# Patient Record
Sex: FEMALE | Race: BLACK OR AFRICAN AMERICAN | NOT HISPANIC OR LATINO
[De-identification: names, ages, dates, MRNs, and addresses within clinical notes are randomized per-mention and may not be internally consistent; named-entity substitution may affect disease eponyms.]

---

## 2021-12-07 ENCOUNTER — RESULT REVIEW (OUTPATIENT)
Age: 61
End: 2021-12-07

## 2021-12-16 PROBLEM — Z00.00 ENCOUNTER FOR PREVENTIVE HEALTH EXAMINATION: Status: ACTIVE | Noted: 2021-12-16

## 2021-12-17 ENCOUNTER — APPOINTMENT (OUTPATIENT)
Dept: PEDIATRIC ORTHOPEDIC SURGERY | Facility: CLINIC | Age: 61
End: 2021-12-17
Payer: COMMERCIAL

## 2021-12-17 VITALS — HEIGHT: 63 IN | WEIGHT: 161 LBS | BODY MASS INDEX: 28.53 KG/M2

## 2021-12-17 DIAGNOSIS — M19.011 PRIMARY OSTEOARTHRITIS, RIGHT SHOULDER: ICD-10-CM

## 2021-12-17 DIAGNOSIS — M79.18 MYALGIA, OTHER SITE: ICD-10-CM

## 2021-12-17 DIAGNOSIS — S13.4XXA SPRAIN OF LIGAMENTS OF CERVICAL SPINE, INITIAL ENCOUNTER: ICD-10-CM

## 2021-12-17 PROCEDURE — 20552 NJX 1/MLT TRIGGER POINT 1/2: CPT | Mod: 59

## 2021-12-17 PROCEDURE — 99202 OFFICE O/P NEW SF 15 MIN: CPT | Mod: 25

## 2021-12-17 PROCEDURE — 72040 X-RAY EXAM NECK SPINE 2-3 VW: CPT

## 2021-12-17 PROCEDURE — 20610 DRAIN/INJ JOINT/BURSA W/O US: CPT

## 2021-12-18 PROBLEM — M19.011 DEGENERATIVE JOINT DISEASE OF RIGHT SHOULDER: Status: ACTIVE | Noted: 2021-12-18

## 2021-12-18 RX ADMIN — Medication 0 %: at 00:00

## 2021-12-18 RX ADMIN — TRIAMCINOLONE ACETONIDE 0 MG/ML: 40 INJECTION, SUSPENSION INTRA-ARTICULAR; INTRAMUSCULAR at 00:00

## 2021-12-18 RX ADMIN — LIDOCAINE HYDROCHLORIDE 0 %: 10 INJECTION, SOLUTION INFILTRATION; PERINEURAL at 00:00

## 2021-12-20 ENCOUNTER — MED ADMIN CHARGE (OUTPATIENT)
Age: 61
End: 2021-12-20

## 2021-12-20 PROBLEM — M79.18 CERVICAL MYOFASCIAL PAIN SYNDROME: Status: ACTIVE | Noted: 2021-12-20

## 2021-12-20 PROBLEM — S13.4XXA SPRAIN OF LIGAMENTS OF CERVICAL SPINE, INITIAL ENCOUNTER: Status: ACTIVE | Noted: 2021-12-20

## 2021-12-20 RX ORDER — TRIAMCINOLONE ACETONIDE 40 MG/ML
40 SUSPENSION INTRA-ARTERIAL; INTRAMUSCULAR
Qty: 1 | Refills: 0 | Status: COMPLETED | OUTPATIENT
Start: 2021-12-18

## 2021-12-20 RX ORDER — LIDOCAINE HYDROCHLORIDE 10 MG/ML
1 INJECTION, SOLUTION INFILTRATION; PERINEURAL
Qty: 0 | Refills: 0 | Status: COMPLETED | OUTPATIENT
Start: 2021-12-18

## 2021-12-20 RX ORDER — DICLOFENAC SODIUM 50 MG/1
50 TABLET, DELAYED RELEASE ORAL TWICE DAILY
Qty: 60 | Refills: 2 | Status: ACTIVE | COMMUNITY
Start: 2021-12-20 | End: 1900-01-01

## 2021-12-20 NOTE — HISTORY OF PRESENT ILLNESS
[de-identified] : This 61-year-old female is here for evaluation of the spontaneous onset of right cervical spine and right shoulder pain without history of injury.  The patient does not have neurological symptomatology.  She has not had any arm numbness.

## 2021-12-20 NOTE — PROCEDURE
[de-identified] : The patient has been injected into the right cervical spine musculature with 2 mL of 1% plain lidocaine and 1 mL of 40 mg of Kenalog.\par \par The right shoulder was injected with 2 mL of 1% plain lidocaine and 1 mL of 40 mg of Kenalog.

## 2021-12-20 NOTE — PHYSICAL EXAM
[de-identified] : On physical examination the patient has pain on attempts at right lateral bending and rotation of the cervical spine..  Motor, sensory and deep tendon  reflex examination of both upper extremities is within normal limits.\par Examination of the right shoulder reveals tenderness in the anterior deltoid bursa and bicipital groove.  The patient has difficulty with forward flexion and abduction of the right shoulder past 110 degrees.  She has limited internal rotation of the shoulder.  There is no AP instability and a negative sulcus sign. 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 dated 11/1/2021 reveals degenerative changes in the acromioclavicular joint.

## 2021-12-20 NOTE — ASSESSMENT
[FreeTextEntry1] : Acute cervical sprain with myalgia\par DJD right shoulder\par \par Besides the cortisone injections the patient will start on diclofenac.  She will return if she is not significantly improved after a few weeks.\par \par Encounter time: 23 minutes

## 2022-02-01 ENCOUNTER — TRANSCRIPTION ENCOUNTER (OUTPATIENT)
Age: 62
End: 2022-02-01

## 2022-02-04 ENCOUNTER — TRANSCRIPTION ENCOUNTER (OUTPATIENT)
Age: 62
End: 2022-02-04

## 2022-03-18 RX ORDER — DICLOFENAC SODIUM 50 MG/1
50 TABLET, DELAYED RELEASE ORAL TWICE DAILY
Qty: 60 | Refills: 2 | Status: ACTIVE | COMMUNITY
Start: 2022-03-18 | End: 1900-01-01

## 2022-05-11 ENCOUNTER — RESULT REVIEW (OUTPATIENT)
Age: 62
End: 2022-05-11

## 2022-06-12 RX ORDER — DICLOFENAC SODIUM 50 MG/1
50 TABLET, DELAYED RELEASE ORAL TWICE DAILY
Qty: 60 | Refills: 2 | Status: ACTIVE | COMMUNITY
Start: 2022-06-12 | End: 1900-01-01

## 2022-08-24 ENCOUNTER — TRANSCRIPTION ENCOUNTER (OUTPATIENT)
Age: 62
End: 2022-08-24

## 2022-09-16 ENCOUNTER — OFFICE (OUTPATIENT)
Dept: URBAN - METROPOLITAN AREA CLINIC 86 | Facility: CLINIC | Age: 62
Setting detail: OPHTHALMOLOGY
End: 2022-09-16
Payer: COMMERCIAL

## 2022-09-16 DIAGNOSIS — H40.003: ICD-10-CM

## 2022-09-16 DIAGNOSIS — H25.13: ICD-10-CM

## 2022-09-16 DIAGNOSIS — G51.4: ICD-10-CM

## 2022-09-16 PROCEDURE — 92014 COMPRE OPH EXAM EST PT 1/>: CPT | Performed by: OPHTHALMOLOGY

## 2022-09-16 PROCEDURE — 92020 GONIOSCOPY: CPT | Performed by: OPHTHALMOLOGY

## 2022-09-16 PROCEDURE — 92250 FUNDUS PHOTOGRAPHY W/I&R: CPT | Performed by: OPHTHALMOLOGY

## 2022-09-16 ASSESSMENT — CONFRONTATIONAL VISUAL FIELD TEST (CVF)
OD_FINDINGS: FULL
OS_FINDINGS: FULL

## 2022-09-16 ASSESSMENT — PACHYMETRY
OD_CT_UM: 556
OD_CT_CORRECTION: -1
OS_CT_CORRECTION: -1
OS_CT_UM: 562

## 2022-09-16 ASSESSMENT — REFRACTION_CURRENTRX
OD_CYLINDER: +1.25
OS_SPHERE: -2.25
OD_OVR_VA: 20/
OD_AXIS: 160
OS_AXIS: 25
OS_OVR_VA: 20/
OD_SPHERE: -2.00
OS_CYLINDER: +0.75

## 2022-09-16 ASSESSMENT — TONOMETRY
OD_IOP_MMHG: 18
OS_IOP_MMHG: 18

## 2022-09-16 ASSESSMENT — VISUAL ACUITY
OD_BCVA: 20/25+
OS_BCVA: 20/25+

## 2022-10-28 ENCOUNTER — TRANSCRIPTION ENCOUNTER (OUTPATIENT)
Age: 62
End: 2022-10-28

## 2022-10-28 RX ORDER — DICLOFENAC SODIUM 50 MG/1
50 TABLET, DELAYED RELEASE ORAL TWICE DAILY
Qty: 60 | Refills: 2 | Status: ACTIVE | COMMUNITY
Start: 2022-10-28 | End: 1900-01-01

## 2022-11-01 ENCOUNTER — TRANSCRIPTION ENCOUNTER (OUTPATIENT)
Age: 62
End: 2022-11-01

## 2022-11-01 RX ORDER — DICLOFENAC SODIUM 50 MG/1
50 TABLET, DELAYED RELEASE ORAL TWICE DAILY
Qty: 60 | Refills: 2 | Status: ACTIVE | COMMUNITY
Start: 2022-11-01 | End: 1900-01-01

## 2022-11-16 ENCOUNTER — RESULT REVIEW (OUTPATIENT)
Age: 62
End: 2022-11-16

## 2024-08-19 ENCOUNTER — OFFICE (OUTPATIENT)
Dept: URBAN - METROPOLITAN AREA CLINIC 86 | Facility: CLINIC | Age: 64
Setting detail: OPHTHALMOLOGY
End: 2024-08-19
Payer: COMMERCIAL

## 2024-08-19 DIAGNOSIS — H25.13: ICD-10-CM

## 2024-08-19 DIAGNOSIS — G70.00: ICD-10-CM

## 2024-08-19 DIAGNOSIS — H40.003: ICD-10-CM

## 2024-08-19 DIAGNOSIS — H52.4: ICD-10-CM

## 2024-08-19 PROCEDURE — 92014 COMPRE OPH EXAM EST PT 1/>: CPT | Performed by: OPHTHALMOLOGY

## 2024-08-19 PROCEDURE — 92015 DETERMINE REFRACTIVE STATE: CPT | Performed by: OPHTHALMOLOGY

## 2024-08-19 PROCEDURE — 92250 FUNDUS PHOTOGRAPHY W/I&R: CPT | Performed by: OPHTHALMOLOGY

## 2024-08-19 PROCEDURE — 92020 GONIOSCOPY: CPT | Performed by: OPHTHALMOLOGY

## 2024-08-19 ASSESSMENT — CONFRONTATIONAL VISUAL FIELD TEST (CVF)
OS_FINDINGS: FULL
OD_FINDINGS: FULL

## 2024-08-29 ENCOUNTER — OFFICE (OUTPATIENT)
Dept: URBAN - METROPOLITAN AREA CLINIC 86 | Facility: CLINIC | Age: 64
Setting detail: OPHTHALMOLOGY
End: 2024-08-29
Payer: COMMERCIAL

## 2024-08-29 DIAGNOSIS — G70.00: ICD-10-CM

## 2024-08-29 DIAGNOSIS — H40.003: ICD-10-CM

## 2024-08-29 DIAGNOSIS — H25.13: ICD-10-CM

## 2024-08-29 PROCEDURE — 99213 OFFICE O/P EST LOW 20 MIN: CPT | Performed by: OPHTHALMOLOGY

## 2024-08-29 PROCEDURE — 92133 CPTRZD OPH DX IMG PST SGM ON: CPT | Performed by: OPHTHALMOLOGY

## 2024-08-29 PROCEDURE — 92083 EXTENDED VISUAL FIELD XM: CPT | Performed by: OPHTHALMOLOGY

## 2024-08-29 ASSESSMENT — CONFRONTATIONAL VISUAL FIELD TEST (CVF)
OD_FINDINGS: FULL
OS_FINDINGS: FULL